# Patient Record
Sex: MALE | Race: OTHER | NOT HISPANIC OR LATINO
[De-identification: names, ages, dates, MRNs, and addresses within clinical notes are randomized per-mention and may not be internally consistent; named-entity substitution may affect disease eponyms.]

---

## 2020-04-26 ENCOUNTER — MESSAGE (OUTPATIENT)
Age: 55
End: 2020-04-26

## 2021-07-07 PROBLEM — Z00.00 ENCOUNTER FOR PREVENTIVE HEALTH EXAMINATION: Status: ACTIVE | Noted: 2021-07-07

## 2021-10-06 ENCOUNTER — RESULT REVIEW (OUTPATIENT)
Age: 56
End: 2021-10-06

## 2022-03-15 ENCOUNTER — APPOINTMENT (OUTPATIENT)
Dept: GASTROENTEROLOGY | Facility: CLINIC | Age: 57
End: 2022-03-15
Payer: COMMERCIAL

## 2022-03-15 ENCOUNTER — NON-APPOINTMENT (OUTPATIENT)
Age: 57
End: 2022-03-15

## 2022-03-15 VITALS
HEART RATE: 98 BPM | DIASTOLIC BLOOD PRESSURE: 88 MMHG | TEMPERATURE: 97.4 F | WEIGHT: 191 LBS | BODY MASS INDEX: 30.7 KG/M2 | HEIGHT: 66 IN | SYSTOLIC BLOOD PRESSURE: 136 MMHG

## 2022-03-15 DIAGNOSIS — Z83.3 FAMILY HISTORY OF DIABETES MELLITUS: ICD-10-CM

## 2022-03-15 DIAGNOSIS — Z82.49 FAMILY HISTORY OF ISCHEMIC HEART DISEASE AND OTHER DISEASES OF THE CIRCULATORY SYSTEM: ICD-10-CM

## 2022-03-15 DIAGNOSIS — Z83.49 FAMILY HISTORY OF OTHER ENDOCRINE, NUTRITIONAL AND METABOLIC DISEASES: ICD-10-CM

## 2022-03-15 PROCEDURE — 99203 OFFICE O/P NEW LOW 30 MIN: CPT

## 2022-03-15 RX ORDER — INSULIN GLARGINE 100 [IU]/ML
100 INJECTION, SOLUTION SUBCUTANEOUS
Refills: 0 | Status: ACTIVE | COMMUNITY

## 2022-03-15 RX ORDER — OMEGA-3/DHA/EPA/FISH OIL 60 MG-90MG
500 CAPSULE ORAL
Refills: 0 | Status: ACTIVE | COMMUNITY

## 2022-03-15 RX ORDER — ASPIRIN 81 MG
81 TABLET, DELAYED RELEASE (ENTERIC COATED) ORAL
Refills: 0 | Status: ACTIVE | COMMUNITY

## 2022-03-15 RX ORDER — MULTIVITAMIN
TABLET ORAL
Refills: 0 | Status: ACTIVE | COMMUNITY

## 2022-03-15 RX ORDER — ATORVASTATIN CALCIUM 40 MG/1
40 TABLET, FILM COATED ORAL
Refills: 0 | Status: ACTIVE | COMMUNITY

## 2022-03-15 RX ORDER — METFORMIN HYDROCHLORIDE 1000 MG/1
1000 TABLET, COATED ORAL TWICE DAILY
Refills: 0 | Status: ACTIVE | COMMUNITY

## 2022-03-15 RX ORDER — ASCORBIC ACID 500 MG
500 TABLET ORAL
Refills: 0 | Status: ACTIVE | COMMUNITY

## 2022-03-15 RX ORDER — FAMOTIDINE 20 MG/1
20 TABLET, FILM COATED ORAL
Refills: 0 | Status: ACTIVE | COMMUNITY

## 2022-03-15 RX ORDER — LISINOPRIL 20 MG/1
20 TABLET ORAL
Refills: 0 | Status: ACTIVE | COMMUNITY

## 2022-03-15 NOTE — ASSESSMENT
[FreeTextEntry1] : 1.  patient has type two diabetes, but he is well, doing well, no medical issues\par and he is up to date with various aspects of diabetic care\par 2.  that being said, he has not been seeing a Medical physician, generalist, and I am referring Gibson to Dr Marcial Samayoa\par 3.  he has not had colonoscopy, needs a baseline initial colonoscopy\par 4  with his reflux symptoms, I do advise an EGD;  \par \par More than 50% of the face to face time was devoted to counseling and /or coordination of care.  THis coordination of care may have included reviewing other medical notes and reports, and communicating with other health professionals\par \par \par AS WE OBTAIN INFORMED CONSENT FOR COLONOSCOPY, UPPER ENDOSCOPY [EGD], OR BOTH PROCEDURES TOGETHER;\par \par As with all procedures, there are risks of which the patient should be aware\par \par 1.  Anesthesia; deep sedation with Propofol;  there is a small risk of aspiration and pulmonary infection.  The Anesthesiologist meets with the patient the morning of the procedure to discuss in more detail\par \par 2.  risk of bleeding; from removal of a polyp, or rarely, from biopsies, 1 % or less\par \par 3.  risk of injury or perforation of the colon or upper GI tract; one in a thousand or less,  from removing a polyp or from advancing the instrument\par \par \par for prep\par please skip Metformin for twenty four hours\par can continue aspirin\par and as for the Lantus;  instead of forty unitis the night before, at bedtime, reduce to twenty units\par \par

## 2022-03-15 NOTE — HISTORY OF PRESENT ILLNESS
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, a physician and Attending Psychiatrist at [facility]..\par \par He is self referred, as we know each other from [place].\par \par His major medical problem is Type 2 Diabetes Mellitus, \par also, \par hypertension\par hyperlipidemia\par esophageal reflux\par obstructive sleep apnea\par \par had a stress test with Dr Interiano of Cardiology about two years ago, and it was negative, except for an allergic skin reaction to the EKG paste.\par \par slight constipation recently\par \par and this is in spite of supplemtation with fiber, in the form of methylcellulose\par \par occasionally a packet of miralax once per month\par \par \par generally regular bowel movements, \par no colonoscopy has been performed..\par \par no fh of gi cancer, son aged seventeen does take miralax for constipation\par \par he does experience heartburn and regurgitation, can be at night sometimes daytimes\par no obvious or consistent relationship with esophageal irritants\par \par pepcid helps to some extent, uses it perhaps five am doses of twenty mg daily, with moderate improvement..\par occasional breakthrough heartburn, approx once per two or three weeks or even in a month\par \par for Diabetes current regimen includes;\par \par metformin 1000 mgm bid\par lantus 40 units hs\par trulicity 1.5 mgm of this medication per week, on Saturday..\par \par ace inhibitor; lisinopril 20 mgm per day\par up to date with stress test\par \par no fh of gi cancer, no colon cancer or polyps...no fh of cancer.\par \par uses CPAP for his obstructive sleep apnea....\par has eye exams..periodically \par \par \par \par \par \par \par

## 2022-04-12 ENCOUNTER — RESULT REVIEW (OUTPATIENT)
Age: 57
End: 2022-04-12

## 2022-04-14 ENCOUNTER — RESULT REVIEW (OUTPATIENT)
Age: 57
End: 2022-04-14

## 2022-04-15 ENCOUNTER — APPOINTMENT (OUTPATIENT)
Dept: GASTROENTEROLOGY | Facility: HOSPITAL | Age: 57
End: 2022-04-15

## 2022-04-15 ENCOUNTER — TRANSCRIPTION ENCOUNTER (OUTPATIENT)
Age: 57
End: 2022-04-15

## 2022-05-05 ENCOUNTER — APPOINTMENT (OUTPATIENT)
Dept: GASTROENTEROLOGY | Facility: CLINIC | Age: 57
End: 2022-05-05

## 2022-07-12 ENCOUNTER — APPOINTMENT (OUTPATIENT)
Dept: GASTROENTEROLOGY | Facility: CLINIC | Age: 57
End: 2022-07-12

## 2022-07-12 DIAGNOSIS — K21.9 GASTRO-ESOPHAGEAL REFLUX DISEASE W/OUT ESOPHAGITIS: ICD-10-CM

## 2022-07-12 PROCEDURE — 99214 OFFICE O/P EST MOD 30 MIN: CPT | Mod: 95

## 2022-07-12 NOTE — HISTORY OF PRESENT ILLNESS
[FreeTextEntry1] : telehealth visit; video and audio\par patient is having a visit to discuss his \par 1.  reflux erosive esophagitis\par \par just the two of us on call\par Dr Rivera is in his office  \par I am in my office at [facility]\par \par patient has provided a consent for this visit\par \par patient had EGD and colonoscopy on April 15, 2022.\par The colonoscopy was negative although the prep was somewhat suboptimal in places\par Grade a erosive esophagitis was detected, and the patient was started on omeprazole 20 mg every morning in place of his current regimen of famotidine.\par He has been using considerable numbers of Tums tablets but as soon as he started the omeprazole, not surprisingly, his symptoms resolved quickly and completely and he has not had any reflux since that time.\par \par He has type 2 diabetes and he sees an endocrinologist Dr. Jess Marr\par \par He also has hypertension hyperlipidemia and obstructive sleep apnea\par He had a negative stress test with Dr. Interiano 2 years earlier

## 2022-07-12 NOTE — ASSESSMENT
[FreeTextEntry1] : 1.  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  I am advising that he use omeprazole at the dose of 20 mg every morning for a total of 6 months, and then switch to alternate day therapy.\par 4.  He can use famotidine on the off days, and supplement the famotidine with as needed alginate therapy\par 5.  It is obvious that a weight loss even of 10 or 20 pounds can be very beneficial not only for his reflux but for his type 2 diabetes, his hypertension, and his obstructive sleep apnea\par 6.  I am planning to refer him to Dr. Hua Doyle, and will reach out to her...both as a PCP, as I feel that this would be a good professional fit, and also, to address medical management for weight reduction\par \par fu visit for six months from now, telehealth is fine\par \par More than 50% of the face to face time was devoted to counseling and /or coordination of care.  THis coordination of care may have included reviewing other medical notes and reports, and communicating with other health professionals\par

## 2023-01-10 ENCOUNTER — APPOINTMENT (OUTPATIENT)
Dept: GASTROENTEROLOGY | Facility: CLINIC | Age: 58
End: 2023-01-10
Payer: COMMERCIAL

## 2023-01-10 DIAGNOSIS — R14.3 FLATULENCE: ICD-10-CM

## 2023-01-10 DIAGNOSIS — E11.9 TYPE 2 DIABETES MELLITUS W/OUT COMPLICATIONS: ICD-10-CM

## 2023-01-10 DIAGNOSIS — R14.2 ERUCTATION: ICD-10-CM

## 2023-01-10 DIAGNOSIS — K22.10 ULCER OF ESOPHAGUS W/OUT BLEEDING: ICD-10-CM

## 2023-01-10 PROCEDURE — 99443: CPT

## 2023-01-10 NOTE — HISTORY OF PRESENT ILLNESS
[FreeTextEntry1] : This is a telephonic visit audio only consent on file Dr. Rivera is at home and I am in my [facility] office.\par [place] [facility] [place] [address]\par Issue to discuss\par 1.  Esophageal reflux\par 2.  Flatulence and eructation of recent onset\par \par I had performed EGD on April 15, 2022 at the same time that I had performed colonoscopy.\par The findings on EGD revealed LA class a esophagitis with several classic erosions 4 to 5 mm in length at the GE junction.\par Omeprazole 20 mg/day was successful and the symptoms quickly came under control\par I also had performed colonoscopy and although the prep was not perfect, the examination was ultimately successful in achieving good visualization and the exam was normal and a follow-up recheck advised in 5 years time\par The reflux symptoms are well controlled and we have decided to go from daily omeprazole after 6 months down to every other day omeprazole\par \par Issues #2 flatulence and eructation, and these have been decidedly worse over the last several months.  The only thing is really changed is he is on a PPI with omeprazole.  Sometimes that can contribute to bacterial overgrowth and more symptoms of gaseousness than previously experienced

## 2023-01-10 NOTE — ASSESSMENT
[FreeTextEntry1] : 1.  It is time to go down to omeprazole alternating with famotidine\par 2.  It will be curious to see if he feels better on the days that he does not have his omeprazole particularly in terms of gaseousness flatulence belching etc.\par 3.  For the flatulence he will try a probiotic\par 4.  He is not particularly constipated\par 5.  We will pay attention to whether dairy products and/or gluten-containing products are playing a role, he does have Pediapred on a daily basis\par 6.  He has made a appointment with a primary care physician IV Dr. Ariadna Richter\par 7.  He has a endocrinologist Dr. Radha Marr, and perhaps a medication for diabetes like Ozempic can also facilitate weight loss\par \par rv in six months time

## 2023-01-26 ENCOUNTER — APPOINTMENT (OUTPATIENT)
Dept: BARIATRICS/WEIGHT MGMT | Facility: CLINIC | Age: 58
End: 2023-01-26

## 2023-01-26 ENCOUNTER — TRANSCRIPTION ENCOUNTER (OUTPATIENT)
Age: 58
End: 2023-01-26

## 2023-04-25 ENCOUNTER — TRANSCRIPTION ENCOUNTER (OUTPATIENT)
Age: 58
End: 2023-04-25

## 2023-05-19 ENCOUNTER — TRANSCRIPTION ENCOUNTER (OUTPATIENT)
Age: 58
End: 2023-05-19

## 2023-05-26 ENCOUNTER — TRANSCRIPTION ENCOUNTER (OUTPATIENT)
Age: 58
End: 2023-05-26

## 2023-06-16 ENCOUNTER — TRANSCRIPTION ENCOUNTER (OUTPATIENT)
Age: 58
End: 2023-06-16

## 2023-06-23 ENCOUNTER — TRANSCRIPTION ENCOUNTER (OUTPATIENT)
Age: 58
End: 2023-06-23

## 2023-06-26 ENCOUNTER — TRANSCRIPTION ENCOUNTER (OUTPATIENT)
Age: 58
End: 2023-06-26

## 2023-06-30 ENCOUNTER — TRANSCRIPTION ENCOUNTER (OUTPATIENT)
Age: 58
End: 2023-06-30

## 2023-07-05 ENCOUNTER — APPOINTMENT (OUTPATIENT)
Dept: NEUROLOGY | Facility: CLINIC | Age: 58
End: 2023-07-05
Payer: COMMERCIAL

## 2023-07-05 PROCEDURE — 95819 EEG AWAKE AND ASLEEP: CPT

## 2023-07-06 ENCOUNTER — APPOINTMENT (OUTPATIENT)
Dept: NEUROLOGY | Facility: CLINIC | Age: 58
End: 2023-07-06
Payer: COMMERCIAL

## 2023-07-06 VITALS
WEIGHT: 173 LBS | SYSTOLIC BLOOD PRESSURE: 143 MMHG | HEART RATE: 101 BPM | DIASTOLIC BLOOD PRESSURE: 88 MMHG | BODY MASS INDEX: 27.8 KG/M2 | HEIGHT: 66 IN

## 2023-07-06 DIAGNOSIS — Z82.49 FAMILY HISTORY OF ISCHEMIC HEART DISEASE AND OTHER DISEASES OF THE CIRCULATORY SYSTEM: ICD-10-CM

## 2023-07-06 DIAGNOSIS — Z83.438 FAMILY HISTORY OF OTHER DISORDER OF LIPOPROTEIN METABOLISM AND OTHER LIPIDEMIA: ICD-10-CM

## 2023-07-06 DIAGNOSIS — Z87.898 PERSONAL HISTORY OF OTHER SPECIFIED CONDITIONS: ICD-10-CM

## 2023-07-06 PROCEDURE — 95708 EEG WO VID EA 12-26HR UNMNTR: CPT

## 2023-07-06 PROCEDURE — 99205 OFFICE O/P NEW HI 60 MIN: CPT

## 2023-07-06 PROCEDURE — 95719 EEG PHYS/QHP EA INCR W/O VID: CPT

## 2023-07-06 PROCEDURE — 95700 EEG CONT REC W/VID EEG TECH: CPT

## 2023-07-08 PROBLEM — Z82.49 FAMILY HISTORY OF MYOCARDIAL INFARCTION: Status: ACTIVE | Noted: 2023-07-08

## 2023-07-08 PROBLEM — Z87.898 HISTORY OF SEIZURE: Status: ACTIVE | Noted: 2023-07-05

## 2023-07-08 PROBLEM — Z83.438 FAMILY HISTORY OF HYPERLIPIDEMIA: Status: ACTIVE | Noted: 2023-07-08

## 2023-07-08 PROBLEM — Z82.49 FAMILY HISTORY OF HYPERTENSION: Status: ACTIVE | Noted: 2023-07-08

## 2023-07-08 NOTE — DATA REVIEWED
[de-identified] : 6/21/2023: Outside study \par MRI brain: no acute infarct, hydrocephalus or midline shift. Mesial temporal lobes are symmetric without signal abnormality.  [de-identified] : 7/5/2023, Ambulatory and routine EEG: normal

## 2023-07-08 NOTE — HISTORY OF PRESENT ILLNESS
[FreeTextEntry1] : Mr. Rivera is a 57-year-old right-handed man with a history of hypertension, hyperlipidemia, obstructive sleep apnea and type II diabetes (2023 HbA1c 5.9%). He is presenting to clinic after a first time possible seizure. \par \par On 23, he was going into the city to see his brother-in-law. He had been in his usual state of health. \par He had taken his glargine at 7 pm and drove into the city with his wife. An hour later, he felt clammy, twitchy, nauseas. It felt like his hypoglycemic episodes of the past. \par He checked the glucometer and blood sugar was in the 60s. He bought a candy bar and sat down to eat it. He lost consciousness. Wife helped him to the ground. No head injury. He had full body convulsions lasting about thirty seconds.  He had urinary incontinence and bit his tongue. He vomited.  He regained consciousness in the ambulance. Was alert unclear if he was confused although he has no memory of the ambulance ride. \par \par Admitted to [facility]. Lactate elevated.  He had an MRI brain without contrast which was normal in the hospital. He did not have an EEG. Impression was provoked seizure in setting of hypoglycemia and he was discharged without seizure medications and with adjustment in his DM medication regimen. \par \par Notes from [facility]: \par As per chart notes, he was in usual state of health. He had taken his glargine at 7 pm and drove into the city with his wife. An hour later, he felt clammy, twitchy, nauseous. It felt like his hypoglycemic episodes of the past. \par He checked the glucometer and blood sugar was in the 60s. He bought a candy bar and sat down to eat it. He lost consciousness. She helped him to the ground. No head injury. He had full body convulsions which lasted 1-2 minutes. \par \par No history of seizures. History of hypoglycemic episodes. Lantus had recently been decreased from 40 units to 35 units because of this. \par \par With regard to insulin, had been on this for 16 years, starting in . Ozempic was recently started. \par \par In the ED, he was hypertensive to 175/105 and HR was 121. He was alert and oriented in the ED. Lactate was 8.3. Hemoglobin A1c 5.9 \par \par 2023:\par MRI brain: no acute infarct, hydrocephalus or midline shift. Mesial temporal lobes are symmetric without signal abnormality. 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

## 2023-07-08 NOTE — PHYSICAL EXAM
[FreeTextEntry1] : Mental Status: AxOx3, euthymic affect. Gives detailed history\par Cranial Nerves \par II: Pupils equal and reactive,  VFF full\par III, IV, VI: EOMI\par V : normal sensation in V1-V3 b/l\par VII : no asymmetry, no nasolabial fold flattening\par VIII: normal hearing to voice \par IX, X: normal palatal elevation, uvula midline\par XI: 5/5 head turn and 5/5 shoulder shrug bilaterally\par XII : midline tongue protrusion\par Motor: no drift in limbs, normal bulk and tone, 5/5 in all extremities\par Sensory: normal to light touch, pinprick and vibration\par Reflexes: brachioradialis, biceps, triceps, patella and ankles 1+ bilaterally\par Toes are down-going \par Coordination: Normal finger to nose\par Gait: normal balance and gait, slight difficulty with tandem\par \par

## 2023-07-08 NOTE — DISCUSSION/SUMMARY
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 The fact that he has no memory of being in the ambulance suggests he had a seizure as opposed to convulsive syncope. \par He was admitted to [facility]. Lactate was elevated. MRI brain without contrast was normal. \par No history of prior seizures. \par \par Possible this was a first time provoked seizure. Ambulatory EEG was normal.

## 2023-07-08 NOTE — ASSESSMENT
Left message to call back for: LM for patient to call back and schedule virtual med check  Information to relay to patient:  Please see note below and schedule upon recall     [FreeTextEntry1] : Please get repeat ambulatory EEG in November\par Follow-up visit in December\par No driving until next visit \par \par  Seizure safety: \par \par Wear a helmet when biking or horseback riding\par No unsupervised swimming\par Showers rather than baths - no bath alone - risk of drowning \par Adjust the temperature on hot water heater to avoid scalding\par If uncontrolled seizures, use microwave rather than stovetop\par Don’t lock door in bathroom, bedroom or on places \par If fall off bed with seizures, try sleeping with mattress on the floor \par Use soft or padded furniture \par Avoid high ladders \par Take medication as prescribed\par Follow driving regulations of people with epilepsy. \par Information for seizures online: please visit [employer]. \par \par

## 2023-07-31 ENCOUNTER — APPOINTMENT (OUTPATIENT)
Dept: NEUROLOGY | Facility: CLINIC | Age: 58
End: 2023-07-31

## 2023-08-03 ENCOUNTER — TRANSCRIPTION ENCOUNTER (OUTPATIENT)
Age: 58
End: 2023-08-03

## 2023-08-07 ENCOUNTER — TRANSCRIPTION ENCOUNTER (OUTPATIENT)
Age: 58
End: 2023-08-07

## 2023-08-17 ENCOUNTER — TRANSCRIPTION ENCOUNTER (OUTPATIENT)
Age: 58
End: 2023-08-17

## 2023-09-05 ENCOUNTER — TRANSCRIPTION ENCOUNTER (OUTPATIENT)
Age: 58
End: 2023-09-05

## 2023-10-17 ENCOUNTER — TRANSCRIPTION ENCOUNTER (OUTPATIENT)
Age: 58
End: 2023-10-17

## 2023-11-16 ENCOUNTER — TRANSCRIPTION ENCOUNTER (OUTPATIENT)
Age: 58
End: 2023-11-16

## 2023-11-17 ENCOUNTER — APPOINTMENT (OUTPATIENT)
Dept: NEUROLOGY | Facility: CLINIC | Age: 58
End: 2023-11-17

## 2023-12-01 ENCOUNTER — APPOINTMENT (OUTPATIENT)
Dept: NEUROLOGY | Facility: CLINIC | Age: 58
End: 2023-12-01
Payer: COMMERCIAL

## 2023-12-01 PROCEDURE — 95819 EEG AWAKE AND ASLEEP: CPT

## 2023-12-02 PROCEDURE — 95700 EEG CONT REC W/VID EEG TECH: CPT

## 2023-12-02 PROCEDURE — 95708 EEG WO VID EA 12-26HR UNMNTR: CPT

## 2023-12-02 PROCEDURE — 95719 EEG PHYS/QHP EA INCR W/O VID: CPT

## 2023-12-05 ENCOUNTER — APPOINTMENT (OUTPATIENT)
Dept: NEUROLOGY | Facility: CLINIC | Age: 58
End: 2023-12-05

## 2023-12-14 ENCOUNTER — APPOINTMENT (OUTPATIENT)
Dept: NEUROLOGY | Facility: CLINIC | Age: 58
End: 2023-12-14
Payer: COMMERCIAL

## 2023-12-14 VITALS
HEART RATE: 75 BPM | DIASTOLIC BLOOD PRESSURE: 91 MMHG | BODY MASS INDEX: 27.8 KG/M2 | WEIGHT: 173 LBS | OXYGEN SATURATION: 98 % | HEIGHT: 66 IN | SYSTOLIC BLOOD PRESSURE: 143 MMHG

## 2023-12-14 PROCEDURE — 99213 OFFICE O/P EST LOW 20 MIN: CPT

## 2023-12-14 RX ORDER — OMEPRAZOLE 20 MG/1
20 CAPSULE, DELAYED RELEASE ORAL
Qty: 90 | Refills: 2 | Status: DISCONTINUED | COMMUNITY
Start: 2022-04-15 | End: 2023-12-14

## 2023-12-14 RX ORDER — SEMAGLUTIDE 2.68 MG/ML
INJECTION, SOLUTION SUBCUTANEOUS
Refills: 0 | Status: ACTIVE | COMMUNITY

## 2023-12-14 RX ORDER — DULAGLUTIDE 0.75 MG/.5ML
0.75 INJECTION, SOLUTION SUBCUTANEOUS
Refills: 0 | Status: DISCONTINUED | COMMUNITY
End: 2023-12-14

## 2023-12-14 RX ORDER — OMEPRAZOLE 20 MG/1
20 CAPSULE, DELAYED RELEASE ORAL
Qty: 90 | Refills: 3 | Status: DISCONTINUED | COMMUNITY
Start: 2023-11-16 | End: 2023-12-14

## 2023-12-14 RX ORDER — INSULIN LISPRO 100 [IU]/ML
INJECTION, SOLUTION INTRAVENOUS; SUBCUTANEOUS
Refills: 0 | Status: ACTIVE | COMMUNITY

## 2023-12-14 RX ORDER — FAMOTIDINE 40 MG/1
40 TABLET, FILM COATED ORAL
Refills: 0 | Status: ACTIVE | COMMUNITY

## 2023-12-14 NOTE — ASSESSMENT
[FreeTextEntry1] : EEG with rare left temporal sharp waves - this can be seen in Temporal Lobe Epilepsy Please monitor clinically carefully - we will monitor off seizure medications as these findings are not definite   If any event of confusion/disorientation, loss of consciousness, call me immediately and we can start  levetiracetam 750 mg every 12 hours  Return to clinic in six months to see Dr. Rangel    Seizure Safety:   Wear a helmet when biking or horseback riding No unsupervised swimming Showers rather than baths - no bath alone - risk of drowning  Adjust the temperature on hot water heater to avoid scalding If uncontrolled seizures, use microwave rather than stovetop Dont lock door in bathroom, bedroom or on places  If fall off bed with seizures, try sleeping with mattress on the floor  Use soft or padded furniture  Avoid high ladders  Take medication as prescribed Follow driving regulations of people with epilepsy.  Please visit Epilepsy [employer] : [email or web address]

## 2023-12-14 NOTE — HISTORY OF PRESENT ILLNESS
[FreeTextEntry1] : Presenting for follow-up. Last seen in clinic on 23. Doing okay. Sugars are betting controlled. Now on Ozempic, helps with weight loss. Has a new job. Enjoying it. No further clinical events concerning for seizure. Drives, but this is minimal. Here to review results of ambulatory EEG.  ______________ Mr. Rivera is a right-handed 57-year-old man with a history of hypertension, hyperlipidemia, obstructive sleep apnea and type II diabetes (2023 HbA1c 5.9%). He is presenting to clinic after a first time possible seizure.   On 23, he was going into the city to see his brother-in-law. He had been in his usual state of health.  He had taken his glargine at 7 pm and drove into the city with his wife. An hour later, he felt clammy, twitchy, nauseas. It felt like his hypoglycemic episodes of the past.  He checked the glucometer and blood sugar was in the 60s. He bought a candy bar and sat down to eat it. He lost consciousness. Wife helped him to the ground. No head injury. He had full body convulsions which lasted 1-2 minutes. He had urinary incontinence and bit his tongue. He vomited.  He regained consciousness in the ambulance. Was alert unclear if he was confused although he has no memory of the ambulance ride.  Wife thinks the convulsion lasted about 30 seconds. He had an MRI brain without contrast which was normal in the hospital. He did not have an EEG. Admitted to [facility]. Impression was provoked seizure in setting of hypoglycemia and he was discharged without seizure medications and with adjustment in his DM medication regimen.   Notes from [facility]:  As per chart notes, he was in usual state of health. He had taken his glargine at 7 pm and drove into the city with his wife. An hour later, he felt clammy, twitchy, nauseous. It felt like his hypoglycemic episodes of the past.  He checked the glucometer and blood sugar was in the 60s. He bought a candy bar and sat down to eat it. He lost consciousness. She helped him to the ground. No head injury. He had full body convulsions which lasted 1-2 minutes.   No history of seizures. History of hypoglycemic episodes. Lantus had recently been decreased from 40 units to 35 units because of this.   With regard to insulin, had been on this for 16 years, starting in . Ozempic was recently started.   In the ED, he was hypertensive to 175/105 and HR was 121. He was alert and oriented in the ED. Lactate was 8.3. Hemoglobin A1c 5.9   2023: MRI brain: no acute infarct, hydrocephalus or midline shift. Mesial temporal lobes are symmetric without signal abnormality.  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

## 2023-12-14 NOTE — PHYSICAL EXAM
[FreeTextEntry1] : Mental Status: alert, gives detailed history  Cranial Nerves  II: VFF full III, IV, VI: EOMI V : normal sensation in V1-V3 b/l VII : no asymmetry, no nasolabial fold flattening VIII: normal hearing to voice  Motor: no drift in limbs  Sensory: normal to light touch Gait: normal balance and gait

## 2023-12-14 NOTE — DISCUSSION/SUMMARY
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 The fact that he has no memory of being in the ambulance suggests he had a seizure as opposed to convulsive syncope.  He was admitted to [facility]. Lactate was elevated. MRI brain without contrast was normal.  No history of prior seizures.  Possible this was a first-time provoked seizure. First ambulatory EEG was normal but second ambulatory EEG with ? of left temporal sharp waves (not definite). Will monitor carefully clinically.

## 2023-12-14 NOTE — DATA REVIEWED
[de-identified] : 6/21/2023: Outside study \par  MRI brain: no acute infarct, hydrocephalus or midline shift. Mesial temporal lobes are symmetric without signal abnormality.  [de-identified] : 7/5/2023, Ambulatory and routine EEG: normal  12/1/2023 Ambulatory EEG: Focal slowing over the left frontal-temporal region and rare slow/sharp waves. These are potentially abnormal.

## 2024-08-27 ENCOUNTER — APPOINTMENT (OUTPATIENT)
Dept: NEUROLOGY | Facility: CLINIC | Age: 59
End: 2024-08-27